# Patient Record
(demographics unavailable — no encounter records)

---

## 2025-01-28 NOTE — HISTORY OF PRESENT ILLNESS
[FreeTextEntry1] : 60yo P2 s/p KEMAR-BSO for menorrhagia (pt reports benign) in 2012, presents for annual exam. Reports for the past month noting intermittent episodes of burning with urination and mild vaginal irritation after intercourse. Denies any vaginal discharge, vaginal dryness, abnormal bleeding or difficulty with intercourse. Also reports occasional hot flashes but tolerable. Denies any other postmenopausal symptoms.   Obhx: C/Sx2 Gynhx: Denies h/o fibroids, cysts, abnormal paps, or STIs Patient endorses family history of cervical cancer, no personal history of cancer.  HCM - Mammogram (9/2024) BI-RADS 3, f/u with Left Diagnostic Mammogram and Ultrasound in 6 months. - Pap/HPV negative (10/2021) - Colonoscopy (9/2022): no polyps, repeat in 10 years (9/2032) - DEXA 2/29/2024: Osteopenia, spine, femoral neck and total hip, repeat in 2 years

## 2025-01-28 NOTE — PHYSICAL EXAM
[Appropriately responsive] : appropriately responsive [Alert] : alert [No Acute Distress] : no acute distress [No Lymphadenopathy] : no lymphadenopathy [Regular Rate Rhythm] : regular rate rhythm [No Murmurs] : no murmurs [Clear to Auscultation B/L] : clear to auscultation bilaterally [Soft] : soft [Non-tender] : non-tender [Non-distended] : non-distended [No HSM] : No HSM [No Lesions] : no lesions [No Mass] : no mass [Oriented x3] : oriented x3 [Examination Of The Breasts] : a normal appearance [No Masses] : no breast masses were palpable [Labia Majora] : normal [Labia Minora] : normal [Normal] : normal [Absent] : absent [Uterine Adnexae] : absent [FreeTextEntry6] : mutliple small 0.5cm red maculae spots noted on chest and neck

## 2025-01-28 NOTE — PLAN
[FreeTextEntry1] : 58yo P2 s/o KEMAR, presents for annual exam #Post-coital dysuria and vaginal irritation - f/u vagintis and UA, Ucx - Discuss recommendations of using water-based lubricants during intercourse to help with see if it alleviates irritation. Also can consider vaginal estrogen cream. Patient has used premarin in past with good results, but states symptoms are not as severe as in past and would like to hold off for now.   #Chest skin lesion - dermatology referall given  #HCM - Mammogram (9/2024) BI-RADS 3, script for b/l diagnostic mammo and US given  - Pap/HPV negative (10/2021) - Colonoscopy (9/2022): no polyps, repeat in 10 years (9/2032) - DEXA 2/29/2024: Osteopenia, spine, femoral neck and total hip, repeat in 2 years

## 2025-05-12 NOTE — PHYSICAL EXAM
[Normal Sclera/Conjunctiva] : normal sclera/conjunctiva [Normal Outer Ear/Nose] : the outer ears and nose were normal in appearance [No Respiratory Distress] : no respiratory distress  [No Accessory Muscle Use] : no accessory muscle use [Clear to Auscultation] : lungs were clear to auscultation bilaterally [Normal Rate] : normal rate  [Regular Rhythm] : with a regular rhythm [Normal S1, S2] : normal S1 and S2 [No Edema] : there was no peripheral edema [Soft] : abdomen soft [Non Tender] : non-tender [Non-distended] : non-distended

## 2025-05-19 NOTE — REVIEW OF SYSTEMS
[Negative] : Heme/Lymph [FreeTextEntry4] : Tinnitus in L Ear [de-identified] : Skin tags in anterior chest, cherry angiomas in left fore arm, near left nasal bridge, mole +nt in right posterior neck

## 2025-05-19 NOTE — HISTORY OF PRESENT ILLNESS
[FreeTextEntry1] : Routine follow-up [de-identified] : 57 y/o F w/ PMHx of Anxiety/Depression, Osteoporosis, s/p KEMAR/BSO who is here for routine follow-up. Today feels well.  Endorses tinnitus in left ear, Had similar complaint last time, wants to see ENT. No other active concerns

## 2025-05-19 NOTE — HISTORY OF PRESENT ILLNESS
[FreeTextEntry1] : Routine follow-up [de-identified] : 59 y/o F w/ PMHx of Anxiety/Depression, Osteoporosis, s/p KEMAR/BSO who is here for routine follow-up. Today feels well.  Endorses tinnitus in left ear, Had similar complaint last time, wants to see ENT. No other active concerns

## 2025-05-19 NOTE — ASSESSMENT
[FreeTextEntry1] : 57 y/o F w/ PMHx of Osteoporosis, s/p KEMAR/BSO who is here for routine follow-up.   # Osteopenia - DEXA 2/29/2024: Osteopenia, spine, femoral neck and total hip, repeat in 2 years - c/w alendronate, refilled today   #Mammogram BIRADS 3 2/2024  - Had US and Mammogram done 3/2025 - BIRADS 2, Benign  - Next mammogram 3/2026   # Atrophic Vaginitis - following up with OB/GYN - was started on Premarin cream    # Tinnitus in left ear #Epistaxis  - Otoscopic examination normal - Counseled against qtips  - ENT eval  # Cherry angiomas in the anterior chest, left forearm and near the right nasal bridge # Moles +nt in right posterior neck # Skin tags +nt in anterior chest - Explained as benign skin changes - Patient wanted dermatology referral  # Anemia - VCE: Possible AVM vs erosion in stomach, no active bleeding - Will do EGD with GI for further evaluation  #Anxiety - Following up with outpatient psychologist  #HCM - Mammogram (3/2025) - BIRADS 2  - Pap/HPV negative (10/2021) - Colonoscopy (9/2022): no polyps, repeat in 10 years (9/2032) - DEXA 2/29/2024: Osteopenia, spine, femoral neck and total hip, repeat in 2 years  RTC in 6 months with routine labs.

## 2025-05-19 NOTE — REVIEW OF SYSTEMS
[Negative] : Heme/Lymph [FreeTextEntry4] : Tinnitus in L Ear [de-identified] : Skin tags in anterior chest, cherry angiomas in left fore arm, near left nasal bridge, mole +nt in right posterior neck